# Patient Record
Sex: FEMALE | RURAL
[De-identification: names, ages, dates, MRNs, and addresses within clinical notes are randomized per-mention and may not be internally consistent; named-entity substitution may affect disease eponyms.]

---

## 2022-03-08 ENCOUNTER — TELEPHONE (OUTPATIENT)
Dept: PULMONOLOGY | Facility: CLINIC | Age: 82
End: 2022-03-08

## 2022-03-08 NOTE — TELEPHONE ENCOUNTER
Call received from Ms.Sheryl Cm, pt's daughter,  to .   has a consult with another provider and per   Ms Toi's request copy of 2018 notes   (including Clinic appt note , Lab results, and CT Head report)  printed and forwarded to /Marvin for  to forward at pt's appointment.